# Patient Record
Sex: FEMALE | Race: WHITE | NOT HISPANIC OR LATINO | Employment: OTHER | URBAN - METROPOLITAN AREA
[De-identification: names, ages, dates, MRNs, and addresses within clinical notes are randomized per-mention and may not be internally consistent; named-entity substitution may affect disease eponyms.]

---

## 2017-03-02 ENCOUNTER — TELEPHONE (OUTPATIENT)
Dept: ORTHOPEDIC SURGERY | Facility: CLINIC | Age: 62
End: 2017-03-02

## 2017-03-03 ENCOUNTER — OFFICE VISIT (OUTPATIENT)
Dept: ORTHOPEDIC SURGERY | Facility: CLINIC | Age: 62
End: 2017-03-03

## 2017-03-03 DIAGNOSIS — G89.29 CHRONIC PAIN OF RIGHT KNEE: Primary | ICD-10-CM

## 2017-03-03 DIAGNOSIS — M25.561 CHRONIC PAIN OF RIGHT KNEE: Primary | ICD-10-CM

## 2017-03-03 PROCEDURE — 20610 DRAIN/INJ JOINT/BURSA W/O US: CPT | Performed by: ORTHOPAEDIC SURGERY

## 2017-03-03 PROCEDURE — 99213 OFFICE O/P EST LOW 20 MIN: CPT | Performed by: ORTHOPAEDIC SURGERY

## 2017-03-03 PROCEDURE — 73562 X-RAY EXAM OF KNEE 3: CPT | Performed by: ORTHOPAEDIC SURGERY

## 2017-03-03 RX ORDER — MELOXICAM 15 MG/1
TABLET ORAL
Qty: 30 TABLET | Refills: 3 | Status: SHIPPED | OUTPATIENT
Start: 2017-03-03 | End: 2018-06-18

## 2017-03-03 RX ADMIN — METHYLPREDNISOLONE ACETATE 80 MG: 80 INJECTION, SUSPENSION INTRA-ARTICULAR; INTRALESIONAL; INTRAMUSCULAR; SOFT TISSUE at 14:26

## 2017-03-03 RX ADMIN — BUPIVACAINE HYDROCHLORIDE 4 ML: 5 INJECTION, SOLUTION EPIDURAL; INTRACAUDAL at 14:26

## 2017-03-03 NOTE — PROGRESS NOTES
Right Knee Joint Injection      Patient: Lizeth Kennedy        YOB: 1955            Chief Complaints: Right Knee pain  Chief Complaint   Patient presents with   • Right Knee - Establish Care           History of Present Illness: Pt gets intermittent  injections with good relief. Is here for repeat injection. Understands options.      Physical Exam: 61 y.o. female  General Appearance:    Alert, cooperative, in no acute distress                 There were no vitals filed for this visit.   Patient is alert and read ×3 no acute distress appears her above-listed at height weight and age.  Affect is normal respiratory rate is normal unlabored. Heart rate regular rate rhythm, sclera, dentition and hearing are normal for the purpose of this exam.  Exam and complaints are unchanged.      Procedure:  Large Joint Arthrocentesis  Date/Time: 3/3/2017 12:48 PM  Consent given by: patient  Site marked: site marked  Timeout: Immediately prior to procedure a time out was called to verify the correct patient, procedure, equipment, support staff and site/side marked as required   Supporting Documentation  Indications: pain   Procedure Details  Location: knee - R knee  Needle size: 25 G  Approach: anteromedial  Medications administered: 4 mL bupivacaine (PF) 0.5 %; 80 mg methylPREDNISolone acetate 80 MG/ML                  Assessment. Persistent knee pain      Plan: Is to proceed with injection    The knee joint was injected under strict sterile technique with Marcaine and Depo-Medrol this was done sterilely and tolerated tolerated well.

## 2017-03-06 NOTE — PROGRESS NOTES
New Knee      Patient: Lizeth Kennedy        YOB: 1955    Medical Record Number: [id number]        Chief Complaints: Left knee pain      History of Present Illness: This is a  61 y.o. female who presents complaining of left knee pain.  Spent ongoing about 3 days no history injury change in activity that she can recall.  I've seen her in the past for her left knee that is been doing good the last I saw her was 2 years ago.  On the right, she has occasional night pain she has distinct swelling and limitation of activity.  She is getting ready to head to [place] on Monday to take care of her grandchildren.  Her symptoms are described as moderate constant aching burning with swelling she has pain with standing walking and running.  Her past medical history is unremarkable.  She is retired        Allergies:   Allergies   Allergen Reactions   • Diazepam Other (See Comments)     OPPOSITE EFFECT--MAKES HYPER       Medications:   Home Medications:  No current outpatient prescriptions on file prior to visit.     No current facility-administered medications on file prior to visit.      Current Medications:  Scheduled Meds:  Continuous Infusions:  No current facility-administered medications for this visit.   PRN Meds:.    History reviewed. No pertinent past medical history.   No past surgical history on file.     Social History     Occupational History   • Not on file.     Social History Main Topics   • Smoking status: Not on file   • Smokeless tobacco: Not on file   • Alcohol use Not on file   • Drug use: Not on file   • Sexual activity: Not on file    Social History     Social History Narrative   • No narrative on file      History reviewed. No pertinent family history.          Review of Systems: Her 14 point review of systems are remarkable for the pertinent positives listed in the chart by the patient the remainder are negative    Review of Systems      Physical Exam: 61 y.o. female  General Appearance:     Alert, cooperative, in no acute distress                 There were no vitals filed for this visit.   Patient is alert and read ×3 no acute distress appears her above-listed at height weight and age.  Affect is normal respiratory rate is normal unlabored. Heart rate regular rate rhythm, sclera, dentition and hearing are normal for the purpose of this exam.        Ortho Exam Physical exam of the right  knee reveals a mild-to-moderate  effusion no redness.  The patient does have tenderness about the medial l joint line.  No tenderness about the lateralRight l joint line.  A negative bounce home and a positive l medial April.    Patient has a stable ligamentous exam.  The patient has a negative Lachman and negative anterior drawer and a negative pivot shift.  Quads are reasonable and symmetric bilaterally.  Calf is soft and nontender.  There is no overlying skin changes no lymphedema lymphadenopathy.  Patient has good hip range of motion full symmetric and asymptomatic and a normal ankle exam.  She has good distal pulses and sensation distally is intact        Large Joint Arthrocentesis  Date/Time: 3/3/2017 2:26 PM  Consent given by: patient  Site marked: site marked  Timeout: Immediately prior to procedure a time out was called to verify the correct patient, procedure, equipment, support staff and site/side marked as required   Supporting Documentation  Indications: pain   Procedure Details  Location: knee - R knee  Needle size: 25 G  Approach: anteromedial  Medications administered: 80 mg methylPREDNISolone acetate 80 MG/ML; 4 mL bupivacaine (PF) 0.5 %                     Radiology:   AP, Lateral and merchant views of the right knee  were ordered/reviewed to evauateknee pain, i have comp to previous films  These show some mild patellofemoral OA otherwise neutral alignment no evidence of any acute bony pathology     Assessment/Plan:        right knee pain I'm suspicious of mechanical i.e. meniscus pathology that  could also be patellofemoral.  I'm going to injectors a diagnostic and therapeutic tool.  If she fails to respond we will proceed with an MRI.  I will also start her on Mobic with strict precautions

## 2017-03-13 RX ORDER — BUPIVACAINE HYDROCHLORIDE 5 MG/ML
4 INJECTION, SOLUTION EPIDURAL; INTRACAUDAL
Status: COMPLETED | OUTPATIENT
Start: 2017-03-03 | End: 2017-03-03

## 2017-03-13 RX ORDER — METHYLPREDNISOLONE ACETATE 80 MG/ML
80 INJECTION, SUSPENSION INTRA-ARTICULAR; INTRALESIONAL; INTRAMUSCULAR; SOFT TISSUE
Status: COMPLETED | OUTPATIENT
Start: 2017-03-03 | End: 2017-03-03

## 2018-04-03 ENCOUNTER — PREP FOR SURGERY (OUTPATIENT)
Dept: OTHER | Facility: HOSPITAL | Age: 63
End: 2018-04-03

## 2018-04-03 DIAGNOSIS — Z12.11 SCREEN FOR COLON CANCER: Primary | ICD-10-CM

## 2018-04-10 PROBLEM — Z12.11 SCREEN FOR COLON CANCER: Status: ACTIVE | Noted: 2018-04-10

## 2018-06-18 RX ORDER — MELATONIN
1000 DAILY
COMMUNITY

## 2018-06-19 ENCOUNTER — ANESTHESIA (OUTPATIENT)
Dept: GASTROENTEROLOGY | Facility: HOSPITAL | Age: 63
End: 2018-06-19

## 2018-06-19 ENCOUNTER — ANESTHESIA EVENT (OUTPATIENT)
Dept: GASTROENTEROLOGY | Facility: HOSPITAL | Age: 63
End: 2018-06-19

## 2018-06-19 ENCOUNTER — HOSPITAL ENCOUNTER (OUTPATIENT)
Facility: HOSPITAL | Age: 63
Setting detail: HOSPITAL OUTPATIENT SURGERY
Discharge: HOME | End: 2018-06-19
Attending: INTERNAL MEDICINE | Admitting: INTERNAL MEDICINE

## 2018-06-19 VITALS
BODY MASS INDEX: 23.37 KG/M2 | WEIGHT: 127 LBS | RESPIRATION RATE: 16 BRPM | HEIGHT: 62 IN | OXYGEN SATURATION: 99 % | TEMPERATURE: 97.6 F | DIASTOLIC BLOOD PRESSURE: 75 MMHG | HEART RATE: 75 BPM | SYSTOLIC BLOOD PRESSURE: 110 MMHG

## 2018-06-19 DIAGNOSIS — Z12.11 SCREEN FOR COLON CANCER: ICD-10-CM

## 2018-06-19 PROCEDURE — 45380 COLONOSCOPY AND BIOPSY: CPT | Performed by: INTERNAL MEDICINE

## 2018-06-19 PROCEDURE — 25010000002 PROPOFOL 10 MG/ML EMULSION: Performed by: NURSE ANESTHETIST, CERTIFIED REGISTERED

## 2018-06-19 PROCEDURE — 88305 TISSUE EXAM BY PATHOLOGIST: CPT | Performed by: INTERNAL MEDICINE

## 2018-06-19 RX ORDER — SODIUM CHLORIDE, SODIUM LACTATE, POTASSIUM CHLORIDE, CALCIUM CHLORIDE 600; 310; 30; 20 MG/100ML; MG/100ML; MG/100ML; MG/100ML
1000 INJECTION, SOLUTION INTRAVENOUS CONTINUOUS
Status: DISCONTINUED | OUTPATIENT
Start: 2018-06-19 | End: 2018-06-19 | Stop reason: HOSPADM

## 2018-06-19 RX ORDER — PROPOFOL 10 MG/ML
VIAL (ML) INTRAVENOUS CONTINUOUS PRN
Status: DISCONTINUED | OUTPATIENT
Start: 2018-06-19 | End: 2018-06-19 | Stop reason: SURG

## 2018-06-19 RX ORDER — SODIUM CHLORIDE 0.9 % (FLUSH) 0.9 %
3 SYRINGE (ML) INJECTION AS NEEDED
Status: DISCONTINUED | OUTPATIENT
Start: 2018-06-19 | End: 2018-06-19 | Stop reason: HOSPADM

## 2018-06-19 RX ORDER — LIDOCAINE HYDROCHLORIDE 10 MG/ML
0.5 INJECTION, SOLUTION INFILTRATION; PERINEURAL ONCE AS NEEDED
Status: DISCONTINUED | OUTPATIENT
Start: 2018-06-19 | End: 2018-06-19 | Stop reason: HOSPADM

## 2018-06-19 RX ADMIN — PROPOFOL 200 MCG/KG/MIN: 10 INJECTION, EMULSION INTRAVENOUS at 07:55

## 2018-06-19 RX ADMIN — SODIUM CHLORIDE, POTASSIUM CHLORIDE, SODIUM LACTATE AND CALCIUM CHLORIDE 1000 ML: 600; 310; 30; 20 INJECTION, SOLUTION INTRAVENOUS at 07:34

## 2018-06-19 NOTE — ANESTHESIA PREPROCEDURE EVALUATION
Anesthesia Evaluation                  Airway   Mallampati: I  TM distance: >3 FB  Neck ROM: full  No difficulty expected  Dental - normal exam     Pulmonary - normal exam   (+) pulmonary embolism,   Cardiovascular - normal exam    (+) DVT,       Neuro/Psych  GI/Hepatic/Renal/Endo      Musculoskeletal     Abdominal    Substance History      OB/GYN          Other                        Anesthesia Plan    ASA 2     MAC     intravenous induction   Anesthetic plan and risks discussed with patient.

## 2018-06-19 NOTE — ANESTHESIA POSTPROCEDURE EVALUATION
Patient: Lizeth Kennedy    Procedure Summary     Date:  06/19/18 Room / Location:   [facility]    Anesthesia Start:  0754 Anesthesia Stop:  0823    Procedure:  COLONOSCOPY with polypectomy (cold bx) (N/A ) Diagnosis:       Screen for colon cancer      (Screen for colon cancer [Z12.11])    Surgeon:  Nnamdi Gregorio MD Provider:  Bridget Canas MD    Anesthesia Type:  MAC ASA Status:  2          Anesthesia Type: MAC  Last vitals  BP   110/75 (06/19/18 0841)   Temp   36.4 °C (97.6 °F) (06/19/18 0831)   Pulse   75 (06/19/18 0841)   Resp   16 (06/19/18 0841)     SpO2   99 % (06/19/18 0841)     Post Anesthesia Care and Evaluation    Patient location during evaluation: bedside  Patient participation: complete - patient participated  Level of consciousness: awake  Pain management: adequate  Airway patency: patent  Anesthetic complications: No anesthetic complications    Cardiovascular status: acceptable  Respiratory status: acceptable  Hydration status: acceptable

## 2018-06-19 NOTE — H&P
"[facility]  Pre Procedure History & Physical    Chief Complaint:   Time for my screening colonoscopy.    Subjective     HPI:   62 y.o. female who is here for a screening colonoscopy. Her last colonoscopy was about 13 yrs ago.     Past Medical History:   Past Medical History:   Diagnosis Date   • History of DVT (deep vein thrombosis) 1976   • History of pulmonary embolism 1976   • PONV (postoperative nausea and vomiting)        Family History:  Family History   Problem Relation Age of Onset   • Malig Hyperthermia Neg Hx        Social History:   reports that she has never smoked. She has never used smokeless tobacco. She reports that she does not drink alcohol or use drugs.    Medications:   Prescriptions Prior to Admission   Medication Sig Dispense Refill Last Dose   • cholecalciferol (VITAMIN D3) 1000 units tablet Take 1,000 Units by mouth Daily.   6/18/2018 at Unknown time   • aspirin 81 MG tablet Take 81 mg by mouth 1 (One) Time Per Week.   6/4/2018       Allergies:  Diazepam and Versed [midazolam]    ROS:    Pertinent items are noted in HPI     Objective     Blood pressure 114/70, pulse 70, temperature 97.8 °F (36.6 °C), temperature source Oral, resp. rate 16, height 157.5 cm (62\"), weight 57.6 kg (127 lb), SpO2 98 %.    Physical Exam   Constitutional: Pt is oriented to person, place, and time and well-developed, well-nourished, and in no distress.   HENT:   Mouth/Throat: Oropharynx is clear and moist.   Neck: Normal range of motion. Neck supple.   Cardiovascular: Normal rate, regular rhythm and normal heart sounds.    Pulmonary/Chest: Effort normal and breath sounds normal. No respiratory distress. No  wheezes.   Abdominal: Soft. Bowel sounds are normal.   Skin: Skin is warm and dry.   Psychiatric: Mood, memory, affect and judgment normal.     Assessment/Plan     Diagnosis:  62 y.o. female who is here for a screening colonoscopy. Her last colonoscopy was about 13 yrs ago.     Anticipated " Surgical Procedure:  Colonoscopy    The risks, benefits, and alternatives of this procedure have been discussed with the patient or the responsible party- the patient understands and agrees to proceed.

## 2018-06-19 NOTE — DISCHARGE INSTRUCTIONS
For the next 24 hours patient needs to be with a responsible adult.    For 24 hours DO NOT drive, operate machinery, appliances, drink alcohol, make important decisions or sign legal documents.    Start with a light or bland diet and advance to regular diet as tolerated.    Follow recommendations on procedure report provided by your doctor.    Call Dr Gregorio for problems .  Problems may include but not limited to: lar7ge amounts of bleeding, trouble breathing, repeated vomiting, severe unrelieved pain, fever or chills.      Call Dr Gregorio if you have not received your pathology results within 14 days.

## 2018-06-20 LAB
CYTO UR: NORMAL
LAB AP CASE REPORT: NORMAL
PATH REPORT.FINAL DX SPEC: NORMAL
PATH REPORT.GROSS SPEC: NORMAL

## 2018-06-28 NOTE — PROGRESS NOTES
Tell her that the rectal polyp that was removed was not cancerous and not precancerous, which is good.  I would recommend a repeat screening colonoscopy in 10 years.

## 2018-08-09 ENCOUNTER — TELEPHONE (OUTPATIENT)
Dept: GASTROENTEROLOGY | Facility: CLINIC | Age: 63
End: 2018-08-09

## 2018-08-09 NOTE — TELEPHONE ENCOUNTER
----- Message from Nnamdi Gregorio MD sent at 6/28/2018  6:10 PM EDT -----  Tell her that the rectal polyp that was removed was not cancerous and not precancerous, which is good.  I would recommend a repeat screening colonoscopy in 10 years.

## 2018-08-09 NOTE — TELEPHONE ENCOUNTER
Called pt at both numbers and left vm for pt to call back.     C/s placed in recall for 06/19/2028.

## 2018-08-10 NOTE — TELEPHONE ENCOUNTER
Call to pt.  Advise per Dr Gregorio that rectal polyp that was removed was not cancerous and not precancerous, which is good.  Would recommend repeat screening c/s in 10 yrs.      Pt verb understanding.

## 2019-02-15 ENCOUNTER — HOSPITAL ENCOUNTER (OUTPATIENT)
Dept: GENERAL RADIOLOGY | Facility: HOSPITAL | Age: 64
Discharge: HOME | End: 2019-02-15
Admitting: INTERNAL MEDICINE

## 2019-02-15 ENCOUNTER — TRANSCRIBE ORDERS (OUTPATIENT)
Dept: ADMINISTRATIVE | Facility: HOSPITAL | Age: 64
End: 2019-02-15

## 2019-02-15 DIAGNOSIS — R52 PAIN: ICD-10-CM

## 2019-02-15 DIAGNOSIS — R52 PAIN: Primary | ICD-10-CM

## 2019-02-15 PROCEDURE — 72220 X-RAY EXAM SACRUM TAILBONE: CPT

## 2019-04-12 ENCOUNTER — OFFICE VISIT (OUTPATIENT)
Dept: ORTHOPEDIC SURGERY | Facility: CLINIC | Age: 64
End: 2019-04-12

## 2019-04-12 VITALS — WEIGHT: 137 LBS | HEIGHT: 62 IN | TEMPERATURE: 98.9 F | BODY MASS INDEX: 25.21 KG/M2

## 2019-04-12 DIAGNOSIS — M19.90 ARTHRITIS: ICD-10-CM

## 2019-04-12 DIAGNOSIS — M25.562 CHRONIC PAIN OF LEFT KNEE: Primary | ICD-10-CM

## 2019-04-12 DIAGNOSIS — G89.29 CHRONIC PAIN OF LEFT KNEE: Primary | ICD-10-CM

## 2019-04-12 PROCEDURE — 20610 DRAIN/INJ JOINT/BURSA W/O US: CPT | Performed by: ORTHOPAEDIC SURGERY

## 2019-04-12 PROCEDURE — 99213 OFFICE O/P EST LOW 20 MIN: CPT | Performed by: ORTHOPAEDIC SURGERY

## 2019-04-12 PROCEDURE — 73562 X-RAY EXAM OF KNEE 3: CPT | Performed by: ORTHOPAEDIC SURGERY

## 2019-04-12 RX ORDER — METHYLPREDNISOLONE ACETATE 80 MG/ML
80 INJECTION, SUSPENSION INTRA-ARTICULAR; INTRALESIONAL; INTRAMUSCULAR; SOFT TISSUE
Status: COMPLETED | OUTPATIENT
Start: 2019-04-12 | End: 2019-04-12

## 2019-04-12 RX ADMIN — METHYLPREDNISOLONE ACETATE 80 MG: 80 INJECTION, SUSPENSION INTRA-ARTICULAR; INTRALESIONAL; INTRAMUSCULAR; SOFT TISSUE at 09:22

## 2019-04-12 NOTE — PROGRESS NOTES
New Left Knee      Patient: Lizeth Kennedy        YOB: 1955    Medical Record Number: [id number]        Chief Complaints: left knee pain  Chief Complaint   Patient presents with   • Left Knee - Establish Care           History of Present Illness: This is a 63-year-old female who I have seen several years ago for left knee pain she was noted to have some degenerative change at that time she did well with an injection however recently over the last couple months she has had worsening of her left knee pain worse with activity her symptoms are moderate intermittent stabbing aching worse with walking and stairs somewhat better with rest she is retired past medical history is unremarkable other than a history of DVT and PE        Allergies:   Allergies   Allergen Reactions   • Diazepam Other (See Comments)     OPPOSITE EFFECT--MAKES HYPER   • Versed [Midazolam] Itching       Medications:   Home Medications:  Current Outpatient Medications on File Prior to Visit   Medication Sig   • aspirin 81 MG tablet Take 81 mg by mouth 1 (One) Time Per Week.   • cholecalciferol (VITAMIN D3) 1000 units tablet Take 1,000 Units by mouth Daily.     No current facility-administered medications on file prior to visit.      Current Medications:  Scheduled Meds:  Continuous Infusions:  No current facility-administered medications for this visit.   PRN Meds:.    Past Medical History:   Diagnosis Date   • History of DVT (deep vein thrombosis)    • History of pulmonary embolism    • PONV (postoperative nausea and vomiting)         Past Surgical History:   Procedure Laterality Date   •  SECTION     • COLONOSCOPY N/A 2018    Procedure: COLONOSCOPY with polypectomy (cold bx);  Surgeon: Nnamdi Gregorio MD;  Location: [facility] END[facility];  Service: Gastroenterology   • HERNIA REPAIR     • KNEE SURGERY Left         Social History     Occupational History   • Not on file   Tobacco Use   • Smoking status: Never Smoker   •  "Smokeless tobacco: Never Used   Substance and Sexual Activity   • Alcohol use: No   • Drug use: No   • Sexual activity: Defer      Social History     Social History Narrative   • Not on file        Family History   Problem Relation Age of Onset   • Malig Hyperthermia Neg Hx              Review of Systems: 14 point review of systems are remarkable for the knee pain only the remainder are negative    Review of Systems      Physical Exam: 63 y.o. female  General Appearance:    Alert, cooperative, in no acute distress                   Vitals:    04/12/19 0908   Temp: 98.9 °F (37.2 °C)   Weight: 62.1 kg (137 lb)   Height: 157.5 cm (62\")      Patient is alert and read ×3 no acute distress appears her above-listed at height weight and age.  Affect is normal respiratory rate is normal unlabored. Heart rate regular rate rhythm, sclera, dentition and hearing are normal for the purpose of this exam.        Ortho Exam Physical exam of the left knee reveals no effusion, no erythema.  It mild loss of extension and full flexion  Patient has mild varus alignment.  They have mild tenderness to palpation about the medial compartment, no tenderness laterally..  The patient has a negative bounce home, negative April and a stable ligamentous exam.  Quad tone is reasonable and symmetric.  There are no overlying skin changes no lymphedema no lymphadenopathy.  There is good hip range of motion which is full symmetric and asymptomatic and a normal ankle exam.      Large Joint Arthrocentesis: L knee  Date/Time: 4/12/2019 9:22 AM  Consent given by: patient  Site marked: site marked  Timeout: Immediately prior to procedure a time out was called to verify the correct patient, procedure, equipment, support staff and site/side marked as required   Supporting Documentation  Indications: pain   Procedure Details  Location: knee - L knee  Needle size: 25 G  Approach: anteromedial  Medications administered: 80 mg methylPREDNISolone acetate 80 " MG/ML; 2 mL lidocaine (cardiac) 20 MG/ML  Patient tolerance: patient tolerated the procedure well with no immediate complications                   Radiology:   AP, Lateral and merchant views of the left knee  were ordered/reviewed to evauateknee pain.  I have compared to previous films she does have marked narrowing of her medial compartment with osteophyte formation but no dramatic change from her last x-rays over 2 years ago  Imaging Results (most recent)     Procedure Component Value Units Date/Time    XR Knee 3 View Left [[id number]] Resulted:  04/12/19 0909     Updated:  04/12/19 0910    Impression:       Ordering physician's impression is located in the Encounter Note dated 04/12/19. X-ray performed in the DR room.          Assessment/Plan:    Left knee pain this is degenerative in origin I think she benefit from an injection first she did well with Synvisc in the past we will bring her back in 1 month for Synvisc

## 2019-06-20 ENCOUNTER — TELEPHONE (OUTPATIENT)
Dept: ORTHOPEDIC SURGERY | Facility: CLINIC | Age: 64
End: 2019-06-20

## 2019-07-11 ENCOUNTER — OFFICE VISIT (OUTPATIENT)
Dept: ORTHOPEDIC SURGERY | Facility: CLINIC | Age: 64
End: 2019-07-11

## 2019-07-11 VITALS — TEMPERATURE: 98.5 F | WEIGHT: 139.4 LBS | BODY MASS INDEX: 25.65 KG/M2 | HEIGHT: 62 IN

## 2019-07-11 DIAGNOSIS — M19.90 ARTHRITIS: Primary | ICD-10-CM

## 2019-07-11 PROCEDURE — 20610 DRAIN/INJ JOINT/BURSA W/O US: CPT | Performed by: ORTHOPAEDIC SURGERY

## 2019-07-11 RX ADMIN — LIDOCAINE HYDROCHLORIDE 2 ML: 10 INJECTION, SOLUTION EPIDURAL; INFILTRATION; INTRACAUDAL; PERINEURAL at 15:02

## 2019-07-15 RX ORDER — LIDOCAINE HYDROCHLORIDE 10 MG/ML
2 INJECTION, SOLUTION EPIDURAL; INFILTRATION; INTRACAUDAL; PERINEURAL
Status: COMPLETED | OUTPATIENT
Start: 2019-07-11 | End: 2019-07-11

## 2019-12-02 ENCOUNTER — PROCEDURE VISIT (OUTPATIENT)
Dept: OBSTETRICS AND GYNECOLOGY | Facility: CLINIC | Age: 64
End: 2019-12-02

## 2019-12-02 ENCOUNTER — OFFICE VISIT (OUTPATIENT)
Dept: OBSTETRICS AND GYNECOLOGY | Facility: CLINIC | Age: 64
End: 2019-12-02

## 2019-12-02 ENCOUNTER — APPOINTMENT (OUTPATIENT)
Dept: WOMENS IMAGING | Facility: HOSPITAL | Age: 64
End: 2019-12-02

## 2019-12-02 VITALS
WEIGHT: 135 LBS | SYSTOLIC BLOOD PRESSURE: 121 MMHG | BODY MASS INDEX: 24.84 KG/M2 | DIASTOLIC BLOOD PRESSURE: 79 MMHG | HEIGHT: 62 IN

## 2019-12-02 DIAGNOSIS — Z78.0 POST-MENOPAUSAL: Primary | ICD-10-CM

## 2019-12-02 DIAGNOSIS — Z01.419 ENCOUNTER FOR GYNECOLOGICAL EXAMINATION WITHOUT ABNORMAL FINDING: Primary | ICD-10-CM

## 2019-12-02 DIAGNOSIS — Z00.00 PREVENTATIVE HEALTH CARE: ICD-10-CM

## 2019-12-02 DIAGNOSIS — Z12.39 SCREENING FOR BREAST CANCER: ICD-10-CM

## 2019-12-02 DIAGNOSIS — N95.1 CLIMACTERIC: ICD-10-CM

## 2019-12-02 DIAGNOSIS — Z12.31 VISIT FOR SCREENING MAMMOGRAM: Primary | ICD-10-CM

## 2019-12-02 PROCEDURE — 77063 BREAST TOMOSYNTHESIS BI: CPT | Performed by: OBSTETRICS & GYNECOLOGY

## 2019-12-02 PROCEDURE — 99396 PREV VISIT EST AGE 40-64: CPT | Performed by: OBSTETRICS & GYNECOLOGY

## 2019-12-02 PROCEDURE — 77067 SCR MAMMO BI INCL CAD: CPT | Performed by: OBSTETRICS & GYNECOLOGY

## 2019-12-02 PROCEDURE — 77067 SCR MAMMO BI INCL CAD: CPT | Performed by: RADIOLOGY

## 2019-12-02 PROCEDURE — 77063 BREAST TOMOSYNTHESIS BI: CPT | Performed by: RADIOLOGY

## 2019-12-02 PROCEDURE — 77080 DXA BONE DENSITY AXIAL: CPT | Performed by: OBSTETRICS & GYNECOLOGY

## 2019-12-02 NOTE — PROGRESS NOTES
Subjective    Chief Complaint   Patient presents with   • Gynecologic Exam     AE      History of Present Illness    Lizeth Kennedy is a 64 y.o. female who presents for annual exam.  Non-smoker.  Colonoscopy this past year and due every 10 years per Dr. Gregorio.  Mammogram today.  DEXA scan being scheduled.  No bleeding and no problems.  Uses a small speculum.    Obstetric History:  OB History     No data available         Menstrual History:     No LMP recorded. Patient is postmenopausal.       Past Medical History:   Diagnosis Date   • History of DVT (deep vein thrombosis) 1976   • History of pulmonary embolism 1976   • PONV (postoperative nausea and vomiting)      Family History   Problem Relation Age of Onset   • Malig Hyperthermia Neg Hx      Social History     Tobacco Use   Smoking Status Never Smoker   Smokeless Tobacco Never Used     [unfilled]    The following portions of the patient's history were reviewed and updated as appropriate: allergies, current medications, past family history, past medical history, past social history, past surgical history and problem list.    Review of Systems   Constitutional: Negative.  Negative for fever and unexpected weight change.   HENT: Negative.    Respiratory: Negative for shortness of breath and wheezing.    Cardiovascular: Negative for chest pain, palpitations and leg swelling.   Gastrointestinal: Negative for abdominal pain, anal bleeding and blood in stool.   Genitourinary: Negative for dysuria, pelvic pain, urgency, vaginal bleeding, vaginal discharge and vaginal pain.   Skin: Negative.    Neurological: Negative.    Hematological: Negative.  Negative for adenopathy.   Psychiatric/Behavioral: Negative.  Negative for dysphoric mood. The patient is not nervous/anxious.             Objective   Physical Exam   Constitutional: She is oriented to person, place, and time. Vital signs are normal. She appears well-developed and well-nourished.   HENT:   Head: Normocephalic.  "  Neck: Trachea normal. No tracheal deviation present. No thyromegaly present.   Cardiovascular: Normal rate, regular rhythm and normal heart sounds.   No murmur heard.  Pulmonary/Chest: Effort normal and breath sounds normal.   Breasts without masses, tenderness or nipple discharge   Abdominal: Soft. Normal appearance. She exhibits no mass. There is no hepatosplenomegaly. There is no tenderness. No hernia.   Genitourinary: Rectum normal, vagina normal and uterus normal. Uterus is not enlarged and not tender. Cervix exhibits no motion tenderness. Right adnexum displays no mass and no tenderness. Left adnexum displays no mass and no tenderness. No vaginal discharge found.   Genitourinary Comments: External genitalia normal    Lymphadenopathy:     She has no cervical adenopathy.     She has no axillary adenopathy.   Neurological: She is alert and oriented to person, place, and time.   Skin: Skin is warm and dry. No rash noted.   Psychiatric: She has a normal mood and affect. Her behavior is normal. Cognition and memory are normal.       /79   Ht 157.5 cm (62\")   Wt 61.2 kg (135 lb)   BMI 24.69 kg/m²     Assessment/Plan   Lizeth was seen today for gynecologic exam.    Diagnoses and all orders for this visit:    Encounter for gynecological examination without abnormal finding  -     IGP,rfx Aptima HPV All Pth    Climacteric    Screening for breast cancer        Mammogram.  DEXA scan.  Return to office 1 year.    Counseled about taking calcium with vitamin D twice daily.             " at delivery/artificial rupture

## 2019-12-03 LAB
CONV .: NORMAL
CYTOLOGIST CVX/VAG CYTO: NORMAL
CYTOLOGY CVX/VAG DOC CYTO: NORMAL
CYTOLOGY CVX/VAG DOC THIN PREP: NORMAL
DX ICD CODE: NORMAL
HIV 1 & 2 AB SER-IMP: NORMAL
OTHER STN SPEC: NORMAL
STAT OF ADQ CVX/VAG CYTO-IMP: NORMAL

## 2019-12-06 ENCOUNTER — TELEPHONE (OUTPATIENT)
Dept: OBSTETRICS AND GYNECOLOGY | Facility: CLINIC | Age: 64
End: 2019-12-06

## 2020-08-25 ENCOUNTER — TELEPHONE (OUTPATIENT)
Dept: ORTHOPEDIC SURGERY | Facility: CLINIC | Age: 65
End: 2020-08-25

## 2020-08-25 NOTE — TELEPHONE ENCOUNTER
FYI: Scheduled patient to see VIKKI this Fri 8/28 at 10:00 AM for FU / LEFT Knee / Discuss GEL INJ (previous 7/11/19). Message sent to Kathy for insurance prior auth. Patient can be reached via cell at [phone]. Thanks /srh

## 2020-08-28 ENCOUNTER — OFFICE VISIT (OUTPATIENT)
Dept: ORTHOPEDIC SURGERY | Facility: CLINIC | Age: 65
End: 2020-08-28

## 2020-08-28 VITALS — HEIGHT: 62 IN | BODY MASS INDEX: 24.31 KG/M2 | TEMPERATURE: 97.2 F | WEIGHT: 132.1 LBS

## 2020-08-28 DIAGNOSIS — M17.12 PRIMARY LOCALIZED OSTEOARTHROSIS OF LEFT LOWER LEG: ICD-10-CM

## 2020-08-28 DIAGNOSIS — G89.29 CHRONIC PAIN OF LEFT KNEE: Primary | ICD-10-CM

## 2020-08-28 DIAGNOSIS — M25.562 CHRONIC PAIN OF LEFT KNEE: Primary | ICD-10-CM

## 2020-08-28 PROCEDURE — 99212 OFFICE O/P EST SF 10 MIN: CPT | Performed by: ORTHOPAEDIC SURGERY

## 2020-08-28 PROCEDURE — 20610 DRAIN/INJ JOINT/BURSA W/O US: CPT | Performed by: ORTHOPAEDIC SURGERY

## 2020-08-28 PROCEDURE — 73562 X-RAY EXAM OF KNEE 3: CPT | Performed by: ORTHOPAEDIC SURGERY

## 2020-08-28 RX ORDER — PHENOL 1.4 %
600 AEROSOL, SPRAY (ML) MUCOUS MEMBRANE DAILY
COMMUNITY

## 2020-08-28 NOTE — PROGRESS NOTES
Patient: Lizeth Kennedy  YOB: 1955  Date of Service: 2020    Chief Complaints: Left knee  Subjective:    History of Present Illness: Pt is seen in the office today with complaints of left knee pain I saw her over a year ago for left knee arthritis she is been doing well she is walking 3 miles a day tolerating things well.  Over the last month she is had worsening of her symptoms no no history injury change in activity she does have occasional swelling she has pain with activity symptoms are moderate intermittent aching worse with activity somewhat better with rest her past medical history is marked for history of DVT and PE.          Allergies:   Allergies   Allergen Reactions   • Diazepam Other (See Comments)     OPPOSITE EFFECT--MAKES HYPER   • Versed [Midazolam] Itching       Medications:   Home Medications:  Current Outpatient Medications on File Prior to Visit   Medication Sig   • aspirin 81 MG tablet Take 81 mg by mouth 1 (One) Time Per Week.   • cholecalciferol (VITAMIN D3) 1000 units tablet Take 1,000 Units by mouth Daily.     No current facility-administered medications on file prior to visit.      Current Medications:  Scheduled Meds:  Continuous Infusions:  No current facility-administered medications for this visit.   PRN Meds:.    I have reviewed the patient's medical history in detail and updated the computerized patient record.  Review and summarization of old records include:    Past Medical History:   Diagnosis Date   • History of DVT (deep vein thrombosis)    • History of pulmonary embolism    • PONV (postoperative nausea and vomiting)         Past Surgical History:   Procedure Laterality Date   •  SECTION     • COLONOSCOPY N/A 2018    Procedure: COLONOSCOPY with polypectomy (cold bx);  Surgeon: Nnamdi Gregorio MD;  Location: [facility];  Service: Gastroenterology   • HERNIA REPAIR     • KNEE SURGERY Left         Social History     Occupational History    • Not on file   Tobacco Use   • Smoking status: Never Smoker   • Smokeless tobacco: Never Used   Substance and Sexual Activity   • Alcohol use: No   • Drug use: No   • Sexual activity: Defer    Social History     Social History Narrative   • Not on file        Family History   Problem Relation Age of Onset   • Malig Hyperthermia Neg Hx        ROS: 14 point review of systems was performed and was negative except for documented findings in HPI and today's encounter.     Allergies:   Allergies   Allergen Reactions   • Diazepam Other (See Comments)     OPPOSITE EFFECT--MAKES HYPER   • Versed [Midazolam] Itching     Constitutional:  Denies fever, shaking or chills   Eyes:  Denies change in visual acuity   HENT:  Denies nasal congestion or sore throat   Respiratory:  Denies cough or shortness of breath   Cardiovascular:  Denies chest pain or severe LE edema   GI:  Denies abdominal pain, nausea, vomiting, bloody stools or diarrhea   Musculoskeletal:  Numbness, tingling, or loss of motor function only as noted above in history of present illness.  : Denies painful urination or hematuria  Integument:  Denies rash, lesion or ulceration   Neurologic:  Denies headache or focal weakness  Endocrine:  Denies lymphadenopathy  Psych:  Denies confusion or change in mental status   Hem:  Denies active bleeding      Physical Exam: 64 y.o. female  Wt Readings from Last 3 Encounters:   12/02/19 61.2 kg (135 lb)   07/11/19 63.2 kg (139 lb 6.4 oz)   04/12/19 62.1 kg (137 lb)       There is no height or weight on file to calculate BMI.  No height and weight on file for this encounter.  There were no vitals filed for this visit.  Vital signs reviewed.   General Appearance:    Alert, cooperative, in no acute distress                    Ortho exam    Physical exam of the left knee reveals no effusion, no erythema.  It mild loss of extension and full flexion  Patient has mild varus alignment.  They have mild tenderness to palpation about  the medial compartment, no tenderness laterally..  The patient has a negative bounce home, negative April and a stable ligamentous exam.  Quad tone is reasonable and symmetric.  There are no overlying skin changes no lymphedema no lymphadenopathy.  There is good hip range of motion which is full symmetric and asymptomatic and a normal ankle exam.        X-rays AP lateral merchant view left knee were taken to evaluate her symptoms and compared to x-rays done 1 year ago they show some medial compartment narrowing with osteophytes on the femur and the tibia and about 75% narrowing of the medial compartment.  This has progressed since last x-rays     .time    Assessment: Left knee pain I think her arthritis is getting worse although she does seem to have found a good recipe for her knee she is able to walk able to tolerate of the activities that she wants to do.  We talked about options I think a steroid injection is reasonable she is done well with Monovisc in the past she would like to do that which is fine told her we could do intermittent steroids we talked about platelets talked about the importance of quad and core strengthening ultimately talked about the potential need for knee replacement the future if her symptoms do not improve with these conservative measures  DX is OA  Plan: Is as above  Follow up as indicated.  Ice, elevate, and rest as needed.  Discussed conservative measures of pain control including ice, bracing.  Also talked about the importance of strengthening and maintaining ideal body weight    Rosario Silva M.D.  Large Joint Arthrocentesis: L knee  Date/Time: 8/28/2020 7:50 AM  Consent given by: patient  Site marked: site marked  Timeout: Immediately prior to procedure a time out was called to verify the correct patient, procedure, equipment, support staff and site/side marked as required   Supporting Documentation  Indications: pain   Procedure Details  Location: knee - L knee  Preparation:  Patient was prepped and draped in the usual sterile fashion  Needle size: 22 G  Approach: anteromedial  Medications administered: 88 mg Hyaluronan 88 MG/4ML; 4 mL lidocaine (cardiac)  Patient tolerance: patient tolerated the procedure well with no immediate complications

## 2020-09-15 ENCOUNTER — TELEPHONE (OUTPATIENT)
Dept: ORTHOPEDIC SURGERY | Facility: CLINIC | Age: 65
End: 2020-09-15

## 2021-02-08 ENCOUNTER — OFFICE VISIT (OUTPATIENT)
Dept: ORTHOPEDIC SURGERY | Facility: CLINIC | Age: 66
End: 2021-02-08

## 2021-02-08 VITALS — WEIGHT: 128 LBS | HEIGHT: 62 IN | BODY MASS INDEX: 23.55 KG/M2 | TEMPERATURE: 97.3 F

## 2021-02-08 DIAGNOSIS — M79.604 PAIN IN BOTH LOWER EXTREMITIES: ICD-10-CM

## 2021-02-08 DIAGNOSIS — M79.605 PAIN IN BOTH LOWER EXTREMITIES: ICD-10-CM

## 2021-02-08 DIAGNOSIS — M17.10 PRIMARY LOCALIZED OSTEOARTHROSIS OF LOWER LEG, UNSPECIFIED LATERALITY: Primary | ICD-10-CM

## 2021-02-08 PROCEDURE — 99213 OFFICE O/P EST LOW 20 MIN: CPT | Performed by: ORTHOPAEDIC SURGERY

## 2021-02-08 PROCEDURE — 20610 DRAIN/INJ JOINT/BURSA W/O US: CPT | Performed by: ORTHOPAEDIC SURGERY

## 2021-02-08 RX ORDER — ALPRAZOLAM 0.5 MG/1
TABLET ORAL
COMMUNITY
Start: 2020-12-01

## 2021-02-08 RX ORDER — ERGOCALCIFEROL 1.25 MG/1
CAPSULE ORAL
COMMUNITY
Start: 2020-11-27

## 2021-02-08 RX ADMIN — METHYLPREDNISOLONE ACETATE 80 MG: 80 INJECTION, SUSPENSION INTRA-ARTICULAR; INTRALESIONAL; INTRAMUSCULAR; SOFT TISSUE at 14:41

## 2021-02-08 NOTE — PROGRESS NOTES
Left knee  Patient: Lizeth Kennedy  YOB: 1955  Date of Service: 2/8/2021    Chief Complaints:   Chief Complaint   Patient presents with   • Left Knee - Follow-up, Pain   Left knee pain  Subjective:    History of Present Illness: Pt is seen in the office today with complaints of left knee pain  This nice lady for years for this left knee while she is had progression of her symptoms now has some complaints of hip and back all probably from an altered gait I do think it is time for her to consider arthroplasty symptoms are moderate to severe activity limiting her past medical history medications are all well listed below she is also complaining of bilateral lower extremity pain is a deep aching pain she states it felt more like this when she had a blood clot so she really feels like it is vascular in some fashion  Chief Complaint   Patient presents with   • Left Knee - Follow-up, Pain   .          Allergies:   Allergies   Allergen Reactions   • Midazolam Itching   • Diazepam Other (See Comments)     OPPOSITE EFFECT--MAKES HYPER  OPPOSITE EFFECT--MAKES HYPER  OPPOSITE EFFECT--MAKES HYPER       Medications:   Home Medications:  Current Outpatient Medications on File Prior to Visit   Medication Sig   • aspirin 81 MG tablet Take 81 mg by mouth 1 (One) Time Per Week.   • calcium carbonate (OS-ELADIO) 600 MG tablet Take 600 mg by mouth Daily.   • vitamin D (ERGOCALCIFEROL) 1.25 MG (00136 UT) capsule capsule    • ALPRAZolam (XANAX) 0.5 MG tablet    • Calcium Carb-Cholecalciferol 600-800 MG-UNIT chewable tablet Chew 1 tablet Daily.   • cholecalciferol (VITAMIN D3) 1000 units tablet Take 1,000 Units by mouth Daily.     No current facility-administered medications on file prior to visit.      Current Medications:  Scheduled Meds:  Continuous Infusions:No current facility-administered medications for this visit.     PRN Meds:.    I have reviewed the patient's medical history in detail and updated the computerized  patient record.  Review and summarization of old records include:    Past Medical History:   Diagnosis Date   • History of DVT (deep vein thrombosis)    • History of pulmonary embolism    • PONV (postoperative nausea and vomiting)         Past Surgical History:   Procedure Laterality Date   •  SECTION     • COLONOSCOPY N/A 2018    Procedure: COLONOSCOPY with polypectomy (cold bx);  Surgeon: Nnamdi Gregorio MD;  Location: [facility] ENDOSCOPY;  Service: Gastroenterology   • HERNIA REPAIR     • KNEE SURGERY Left         Social History     Occupational History   • Not on file   Tobacco Use   • Smoking status: Never Smoker   • Smokeless tobacco: Never Used   Substance and Sexual Activity   • Alcohol use: No   • Drug use: No   • Sexual activity: Defer      Social History     Social History Narrative   • Not on file        Family History   Problem Relation Age of Onset   • Malig Hyperthermia Neg Hx        ROS: 14 point review of systems was performed and was negative except for documented findings in HPI and today's encounter.     Allergies:   Allergies   Allergen Reactions   • Midazolam Itching   • Diazepam Other (See Comments)     OPPOSITE EFFECT--MAKES HYPER  OPPOSITE EFFECT--MAKES HYPER  OPPOSITE EFFECT--MAKES HYPER     Constitutional:  Denies fever, shaking or chills   Eyes:  Denies change in visual acuity   HENT:  Denies nasal congestion or sore throat   Respiratory:  Denies cough or shortness of breath   Cardiovascular:  Denies chest pain or severe LE edema   GI:  Denies abdominal pain, nausea, vomiting, bloody stools or diarrhea   Musculoskeletal:  Numbness, tingling, or loss of motor function only as noted above in history of present illness.  : Denies painful urination or hematuria  Integument:  Denies rash, lesion or ulceration   Neurologic:  Denies headache or focal weakness  Endocrine:  Denies lymphadenopathy  Psych:  Denies confusion or change in mental status   Hem:  Denies active  bleeding      Physical Exam: 65 y.o. female  Wt Readings from Last 3 Encounters:   02/08/21 58.1 kg (128 lb)   08/28/20 59.9 kg (132 lb 1.6 oz)   12/02/19 61.2 kg (135 lb)       Body mass index is 23.41 kg/m².  Facility age limit for growth percentiles is 20 years.  Vitals:    02/08/21 1439   Temp: 97.3 °F (36.3 °C)     Vital signs reviewed.   General Appearance:    Alert, cooperative, in no acute distress                    Ortho exam      Physical exam of the left knee reveals no effusion, no erythema.  It mild loss of extension and full flexion  Patient has mild varus alignment.  They have mild tenderness to palpation about the medial compartment, no tenderness laterally..  The patient has a negative bounce home, negative April and a stable ligamentous exam.  Quad tone is reasonable and symmetric.  There are no overlying skin changes no lymphedema no lymphadenopathy.  There is good hip range of motion which is full symmetric and asymptomatic and a normal ankle exam.    She does have good distal pulses she is neurologically intact distally     I did review x-rays from August she does have significant medial compartment OA on the left also moderate severe patellofemoral OA    Assessment: Left knee DJD I really think it is time for her to start thinking about arthroplasty I will have her meet with Dr. Shannon to at least discuss this she would she know she does not want to do it within the next 2 or 3 months so I think an injection today is quite reasonable.  As far as her bilateral lower extremity pain I think this may be more vascular claudication the other option would be spinal stenosis I will have her see one of the vascular guys to rule out vascular issues    Plan:   Follow up as indicated.  Ice, elevate, and rest as needed.  Discussed conservative measures of pain control including ice, bracing.  Also talked about the importance of strengthening and maintaining ideal body weight    Rosario Silva  M.D.        Large Joint Arthrocentesis: L knee  Date/Time: 2/8/2021 2:41 PM  Consent given by: patient  Site marked: site marked  Timeout: Immediately prior to procedure a time out was called to verify the correct patient, procedure, equipment, support staff and site/side marked as required   Supporting Documentation  Indications: pain   Procedure Details  Location: knee - L knee  Preparation: Patient was prepped and draped in the usual sterile fashion  Needle size: 22 G  Approach: anteromedial  Medications administered: 4 mL lidocaine (cardiac); 80 mg methylPREDNISolone acetate 80 MG/ML  Patient tolerance: patient tolerated the procedure well with no immediate complications

## 2021-02-08 NOTE — PROGRESS NOTES
New bilateral Hip      Patient: Lizeth Kennedy        YOB: 1955    Medical Record Number: [id number]        Chief Complaints: bilateral hip pain      History of Present Illness:     HPI      Allergies:   Allergies   Allergen Reactions   • Diazepam Other (See Comments)     OPPOSITE EFFECT--MAKES HYPER   • Versed [Midazolam] Itching       Medications:   Home Medications:  Current Outpatient Medications on File Prior to Visit   Medication Sig   • aspirin 81 MG tablet Take 81 mg by mouth 1 (One) Time Per Week.   • calcium carbonate (OS-ELADIO) 600 MG tablet Take 600 mg by mouth Daily.   • cholecalciferol (VITAMIN D3) 1000 units tablet Take 1,000 Units by mouth Daily.     No current facility-administered medications on file prior to visit.      Current Medications:  Scheduled Meds:  Continuous Infusions:No current facility-administered medications for this visit.     PRN Meds:.    Past Medical History:   Diagnosis Date   • History of DVT (deep vein thrombosis)    • History of pulmonary embolism    • PONV (postoperative nausea and vomiting)         Past Surgical History:   Procedure Laterality Date   •  SECTION     • COLONOSCOPY N/A 2018    Procedure: COLONOSCOPY with polypectomy (cold bx);  Surgeon: Nnamdi Gregorio MD;  Location: [facility] ENDOSCOPY;  Service: Gastroenterology   • HERNIA REPAIR     • KNEE SURGERY Left         Social History     Occupational History   • Not on file   Tobacco Use   • Smoking status: Never Smoker   • Smokeless tobacco: Never Used   Substance and Sexual Activity   • Alcohol use: No   • Drug use: No   • Sexual activity: Defer      Social History     Social History Narrative   • Not on file        Family History   Problem Relation Age of Onset   • Malig Hyperthermia Neg Hx              Review of Systems: ***  Review of Systems      Physical Exam: 65 y.o. female  General Appearance:    Alert, cooperative, in no acute distress                 There were no vitals filed  for this visit.   Patient is alert and read ×3 no acute distress appears her above-listed at height weight and age.  Affect is normal respiratory rate is normal unlabored. Heart rate regular rate rhythm, sclera, dentition and hearing are normal for the purpose of this exam.        Ortho Exam           Radiology:   AP, Lateral of the hip was ordered/reviewed to evauateknhip pain. I have compared to previous films/  I have no films for comparison.        Assessment/Plan:

## 2021-02-09 RX ORDER — METHYLPREDNISOLONE ACETATE 80 MG/ML
80 INJECTION, SUSPENSION INTRA-ARTICULAR; INTRALESIONAL; INTRAMUSCULAR; SOFT TISSUE
Status: COMPLETED | OUTPATIENT
Start: 2021-02-08 | End: 2021-02-08

## 2021-02-15 ENCOUNTER — TELEPHONE (OUTPATIENT)
Dept: ORTHOPEDICS | Facility: OTHER | Age: 66
End: 2021-02-15

## 2021-02-15 NOTE — TELEPHONE ENCOUNTER
Hub staff attempted to follow warm transfer process and was unsuccessful     Caller: AMANDA CLARK    Relationship to patient: SELF    Best call back number: [phone]    Patient is needing: PATIENT WAS RETURNING CALL FOR REJI. TRIED TO WARM TRANSFER AND WAS SENT TO HER VOICEMAIL. PATIENT IS JUST LOOKING FOR  TO  REFERRAL, AN INSURANCE REFERRAL IS NOT NEEDED. PLEASE CALL PT BACK AT NUMBER LISTED ABOVE

## 2021-02-15 NOTE — TELEPHONE ENCOUNTER
Caller: AMANDA CLARK    Relationship: SELF    Best call back number: [phone] CELL    What orders are you requesting (i.e. lab or imaging): REFERRAL FOR VASCULAR-     In what timeframe would the patient need to come in: ASAP    Where will you receive your lab/imaging services: WHERE DR ALLEN PREFERS    Additional notes: PATIENT CALLED OFFICE TO FOLLOW UP ON REFERRAL- WHEN SHE SPOKE WITH OFFICE THEY TOLD PATIENT THEY DO NOT ACCEPT FAX REFERRALS- ONLY VERBAL- AND WAS VERY SHORT AND RUDE WITH PATIENT- PATIENT CALLED US TO FOLLOW UP- PLEASE CONTACT PATIENT WHEN COMPLETED SHE WANTS TO STOP THE PAIN IN BOTH LEGS-    THANK YOU MAY CALL ANYTIME- MAY LEAVE

## 2021-03-02 ENCOUNTER — OFFICE VISIT (OUTPATIENT)
Dept: ORTHOPEDIC SURGERY | Facility: CLINIC | Age: 66
End: 2021-03-02

## 2021-03-02 VITALS — BODY MASS INDEX: 23.41 KG/M2 | WEIGHT: 128 LBS | TEMPERATURE: 97.2 F

## 2021-03-02 DIAGNOSIS — M17.12 PRIMARY OSTEOARTHRITIS OF LEFT KNEE: ICD-10-CM

## 2021-03-02 DIAGNOSIS — R52 PAIN: Primary | ICD-10-CM

## 2021-03-02 PROCEDURE — 99213 OFFICE O/P EST LOW 20 MIN: CPT | Performed by: NURSE PRACTITIONER

## 2021-03-02 PROCEDURE — 73562 X-RAY EXAM OF KNEE 3: CPT | Performed by: NURSE PRACTITIONER

## 2021-03-02 NOTE — PROGRESS NOTES
willowPatient: Lizeth Kennedy  YOB: 1955 65 y.o. female  Medical Record Number: [id number]    Chief Complaints:   Chief Complaint   Patient presents with   • Left Knee - Establish Care, Pain       History of Present Illness:Lizeth Kennedy is a 65 y.o. female who presents with complaints of having left knee pain for several years.  Patient reports her pain is mostly to the posterior aspect of her left knee but does report at times it does hurt globally.  Patient reports her pain is not daily any complications.  She reports going up and down stairs worsens the pain, sitting down eases the pain.  Patient reports whenever her pain is at its peak it is a 7 out of 10.  Patient denies any injury to her knee.  Patient reports that she has had an ongoing issue and is seeing Dr. Silva in the past.  Patient states that Dr. Silva has done several cortisone shots as well as viscosupplementation to her left knee.  She reports that viscosupplementation that usually works well and has had great luck in the past, but received a injection approximately a month ago when her pain is starting to come back.  Patient was referred to us for further evaluation.    Allergies:   Allergies   Allergen Reactions   • Midazolam Itching   • Diazepam Other (See Comments)     OPPOSITE EFFECT--MAKES HYPER  OPPOSITE EFFECT--MAKES HYPER  OPPOSITE EFFECT--MAKES HYPER       Medications:   Current Outpatient Medications   Medication Sig Dispense Refill   • ALPRAZolam (XANAX) 0.5 MG tablet      • aspirin 81 MG tablet Take 81 mg by mouth 1 (One) Time Per Week.     • Calcium Carb-Cholecalciferol 600-800 MG-UNIT chewable tablet Chew 1 tablet Daily.     • calcium carbonate (OS-ELADIO) 600 MG tablet Take 600 mg by mouth Daily.     • cholecalciferol (VITAMIN D3) 1000 units tablet Take 1,000 Units by mouth Daily.     • vitamin D (ERGOCALCIFEROL) 1.25 MG (62809 UT) capsule capsule        No current facility-administered medications for this visit.           The following portions of the patient's history were reviewed and updated as appropriate: allergies, current medications, past family history, past medical history, past social history, past surgical history and problem list.    Review of Systems:   A 14 point review of systems was performed. All systems negative except pertinent positives/negative listed in HPI above    Physical Exam:   Vitals:    03/02/21 1116   Temp: 97.2 °F (36.2 °C)   TempSrc: Temporal   Weight: 58.1 kg (128 lb)       General: A and O x 3, ASA, NAD    SCLERA:    Normal    DENTITION:   Normal    Knee:  left    ALIGNMENT: Slight Varus  ,   Patella  tracks  midline    GAIT:    Antalgic    SKIN:    No abnormality    RANGE OF MOTION:   3  -  115   DEG    STRENGTH:   4  / 5    LIGAMENTS:    No varus / valgus instability.   Negative  Lachman.    MENISCUS:     Negative   April       DISTAL PULSES:    Paplable    DISTAL SENSATION :   Intact    LYMPHATICS:     No   lymphadenopathy    OTHER:          - Positive   effusion      - Crepitance with ROM            Radiology:  Xrays 3views (ap,lateral, sunrise) were ordered and reviewed for evaluation of knee pain demonstrating advanced osteoarthritis with bone on bone articulation, subchondral cysts, and periarticular osteophytes.  There does seem to be some progression of the osteoarthritis from previous x-rays taken.    Assessment/Plan: Left knee OA  Treatment options and x-ray results were discussed with the patient.  At this point in time we will continue on with conservative measures.  Patient recently had a gel injection last month and we told her that we can probably do a steroid injection in 2 more months should she require one at that time.  We did educate the patient that we have to wait 6 months in between gel injections.  We did instruct the patient that at some point she will require a knee replacement.  At this point patient can follow-up with us on an as-needed basis.  Patient voices  understanding satisfaction with this plan today.    Layton Alves, APRN  3/2/2021     This patient was seen in conjunction today with Dr. Desean Shannon.  Dr. Shannon agrees with the above-stated assessment and plan.

## 2021-03-19 ENCOUNTER — BULK ORDERING (OUTPATIENT)
Dept: CASE MANAGEMENT | Facility: OTHER | Age: 66
End: 2021-03-19

## 2021-03-19 DIAGNOSIS — Z23 IMMUNIZATION DUE: ICD-10-CM

## 2022-04-11 ENCOUNTER — TELEPHONE (OUTPATIENT)
Dept: ORTHOPEDIC SURGERY | Facility: CLINIC | Age: 67
End: 2022-04-11

## 2022-04-11 NOTE — TELEPHONE ENCOUNTER
GOING OUT OF TOWN - WOULD LIKE TO COME IN BETWEEN NOW THROUGH 4/19/2022 - PATIENT ADVISED SOMEONE FROM CLINICAL WOULD CALL HER. PLZ ELADIO HER AT  [phone]

## 2022-04-11 NOTE — TELEPHONE ENCOUNTER
As long as she has not had one in the last 6 months than we can order her one. Can you check and see when her last gel injection was.

## 2022-04-15 ENCOUNTER — CLINICAL SUPPORT (OUTPATIENT)
Dept: ORTHOPEDIC SURGERY | Facility: CLINIC | Age: 67
End: 2022-04-15

## 2022-04-15 VITALS — BODY MASS INDEX: 23.55 KG/M2 | HEIGHT: 62 IN | WEIGHT: 128 LBS | TEMPERATURE: 96.4 F

## 2022-04-15 DIAGNOSIS — R52 PAIN: Primary | ICD-10-CM

## 2022-04-15 DIAGNOSIS — M17.12 PRIMARY OSTEOARTHRITIS OF LEFT KNEE: ICD-10-CM

## 2022-04-15 PROCEDURE — 20610 DRAIN/INJ JOINT/BURSA W/O US: CPT | Performed by: NURSE PRACTITIONER

## 2022-04-15 PROCEDURE — 73562 X-RAY EXAM OF KNEE 3: CPT | Performed by: NURSE PRACTITIONER

## 2022-04-15 NOTE — PROGRESS NOTES
4/15/2022    Lizeth Kennedy is here today for worsening knee pain. Pt has undergone injection of the knee in the past with good resolution of symptoms. Pt is requesting a repeat injection.     KNEE Injection Procedure Note:    Large Joint Arthrocentesis: L knee  Date/Time: 4/15/2022 9:22 AM  Consent given by: patient  Site marked: site marked  Timeout: Immediately prior to procedure a time out was called to verify the correct patient, procedure, equipment, support staff and site/side marked as required   Supporting Documentation  Indications: pain and joint swelling   Procedure Details  Location: knee - L knee  Preparation: Patient was prepped and draped in the usual sterile fashion  Needle size: 22 G  Approach: anterolateral  Medications administered: 2 mL lidocaine (cardiac); 88 mg Hyaluronan 88 MG/4ML  Patient tolerance: patient tolerated the procedure well with no immediate complications        X-rays 3 views of the left knee were ordered and reviewed for evaluation of left knee pain that show moderate joint space narrowing to the medial compartment with periarticular osteophytes present, subchondral sclerosis and cystic changes, as well as patellofemoral osteoarthritis present.  When in comparison to previous x-rays demonstrates mild fatty changes.    Lidocaine was used as a numbing agent for the soft tissue and was not  combined with the gel injection.    At the conclusion of the injection I discussed the importance of continued quad strengthening exercises on a daily basis. I will see the patient back if the symptoms should fail to improve or worsen.    Layton Alves, ESTRADA  4/15/2022

## 2022-05-18 ENCOUNTER — TELEPHONE (OUTPATIENT)
Dept: ORTHOPEDIC SURGERY | Facility: CLINIC | Age: 67
End: 2022-05-18

## 2022-05-18 NOTE — TELEPHONE ENCOUNTER
I would recommend scheduled Tylenol 650 mg every 6 hours.  She can also take Aleve or Advil as long as she does not have kidney issues or stomach problems with anti-inflammatories.  Additionally she should ice the knee.  If its not improving she can call back to be seen for further planning

## 2022-05-18 NOTE — TELEPHONE ENCOUNTER
Caller: AMANDA CLARK    Relationship: SELF    Best call back number: [phone]    What is the best time to reach you: ANY    What was the call regarding: PT HAD A GEL INJECTION IN LEFT KNEE ON 4.15.2022. PT STARTED HAVING PAIN IN THE SAME LEG A WEEK AND A HALF AGO. SHE IS OUT OF TOWN UNTIL Tuesday (5.24.22). HER LEG IS HURTING FROM HER HIP AREA TO HER FOOT. SHE IS LOOKING FOR DIRECTION AS TO WHAT SHE SHOULD DO.     Do you require a callback: YES

## 2022-07-13 ENCOUNTER — PROCEDURE VISIT (OUTPATIENT)
Dept: OBSTETRICS AND GYNECOLOGY | Facility: CLINIC | Age: 67
End: 2022-07-13

## 2022-07-13 ENCOUNTER — APPOINTMENT (OUTPATIENT)
Dept: WOMENS IMAGING | Facility: HOSPITAL | Age: 67
End: 2022-07-13

## 2022-07-13 DIAGNOSIS — Z12.31 VISIT FOR SCREENING MAMMOGRAM: Primary | ICD-10-CM

## 2022-07-13 PROCEDURE — 77063 BREAST TOMOSYNTHESIS BI: CPT | Performed by: OBSTETRICS & GYNECOLOGY

## 2022-07-13 PROCEDURE — 77063 BREAST TOMOSYNTHESIS BI: CPT | Performed by: RADIOLOGY

## 2022-07-13 PROCEDURE — 77067 SCR MAMMO BI INCL CAD: CPT | Performed by: RADIOLOGY

## 2022-07-13 PROCEDURE — 77067 SCR MAMMO BI INCL CAD: CPT | Performed by: OBSTETRICS & GYNECOLOGY

## 2022-10-18 ENCOUNTER — CLINICAL SUPPORT (OUTPATIENT)
Dept: ORTHOPEDIC SURGERY | Facility: CLINIC | Age: 67
End: 2022-10-18

## 2022-10-18 VITALS — WEIGHT: 128 LBS | TEMPERATURE: 96.7 F | BODY MASS INDEX: 23.55 KG/M2 | HEIGHT: 62 IN | RESPIRATION RATE: 16 BRPM

## 2022-10-18 DIAGNOSIS — M17.12 PRIMARY OSTEOARTHRITIS OF LEFT KNEE: Primary | ICD-10-CM

## 2022-10-18 PROCEDURE — 20610 DRAIN/INJ JOINT/BURSA W/O US: CPT | Performed by: NURSE PRACTITIONER

## 2022-10-18 NOTE — PROGRESS NOTES
10/18/2022    Lizeth Kennedy is here today for worsening knee pain. Pt has undergone injection of the knee in the past with good resolution of symptoms. Pt is requesting a repeat injection.     KNEE Injection Procedure Note:    Large Joint Arthrocentesis: L knee  Date/Time: 10/18/2022 1:06 PM  Consent given by: patient  Site marked: site marked  Timeout: Immediately prior to procedure a time out was called to verify the correct patient, procedure, equipment, support staff and site/side marked as required   Supporting Documentation  Indications: pain and joint swelling   Procedure Details  Location: knee - L knee  Preparation: Patient was prepped and draped in the usual sterile fashion  Needle size: 22 G  Approach: anterolateral  Medications administered: 3 mL lidocaine (cardiac); 88 mg Hyaluronan 88 MG/4ML  Patient tolerance: patient tolerated the procedure well with no immediate complications          At the conclusion of the injection I discussed the importance of continued quad strengthening exercises on a daily basis. I will see the patient back if the symptoms should fail to improve or worsen.    Layton Alves, APRN  10/18/2022

## 2023-04-27 ENCOUNTER — CLINICAL SUPPORT (OUTPATIENT)
Dept: ORTHOPEDIC SURGERY | Facility: CLINIC | Age: 68
End: 2023-04-27
Payer: MEDICARE

## 2023-04-27 VITALS — TEMPERATURE: 97.6 F | BODY MASS INDEX: 23.55 KG/M2 | HEIGHT: 62 IN | RESPIRATION RATE: 16 BRPM | WEIGHT: 128 LBS

## 2023-04-27 DIAGNOSIS — R52 PAIN: Primary | ICD-10-CM

## 2023-04-27 DIAGNOSIS — M17.12 PRIMARY OSTEOARTHRITIS OF LEFT KNEE: ICD-10-CM

## 2023-04-27 RX ORDER — LIDOCAINE HYDROCHLORIDE 10 MG/ML
3 INJECTION, SOLUTION EPIDURAL; INFILTRATION; INTRACAUDAL; PERINEURAL
Status: COMPLETED | OUTPATIENT
Start: 2023-04-27 | End: 2023-04-27

## 2023-04-27 RX ADMIN — LIDOCAINE HYDROCHLORIDE 3 ML: 10 INJECTION, SOLUTION EPIDURAL; INFILTRATION; INTRACAUDAL; PERINEURAL at 11:04

## 2023-04-27 NOTE — PROGRESS NOTES
4/27/2023    Lizeth Kennedy is here today for worsening knee pain. Pt has undergone injection of the knee in the past with good resolution of symptoms. Pt is requesting a repeat injection.     KNEE Injection Procedure Note:    Large Joint Arthrocentesis: L knee  Date/Time: 4/27/2023 11:04 AM  Consent given by: patient  Site marked: site marked  Timeout: Immediately prior to procedure a time out was called to verify the correct patient, procedure, equipment, support staff and site/side marked as required   Supporting Documentation  Indications: pain and joint swelling   Procedure Details  Location: knee - L knee  Preparation: Patient was prepped and draped in the usual sterile fashion  Needle size: 22 G  Approach: anterolateral  Medications administered: 88 mg Hyaluronan 88 MG/4ML; 3 mL lidocaine PF 1% 1 %  Patient tolerance: patient tolerated the procedure well with no immediate complications      3 mL of lidocaine was used for anesthetic purposes    Images: X-rays 3 views left knee (AP, lateral, sunrise views) ordered and reviewed for evaluation of left knee pain which demonstrate advanced varus deformity with near bone-on-bone articulation, periarticular osteophytes present as well as chondrocalcinosis.  Patient also has medial patella facet osteoarthritis that is bone-on-bone contact.  In comparison to previous films patient does not demonstrate significant arthritic changes.    At the conclusion of the injection I discussed the importance of continued quad strengthening exercises on a daily basis. I will see the patient back if the symptoms should fail to improve or worsen.    Layton Alves, APRN  4/27/2023

## 2023-10-27 ENCOUNTER — CLINICAL SUPPORT (OUTPATIENT)
Dept: ORTHOPEDIC SURGERY | Facility: CLINIC | Age: 68
End: 2023-10-27
Payer: MEDICARE

## 2023-10-27 VITALS — HEIGHT: 62 IN | WEIGHT: 128.8 LBS | BODY MASS INDEX: 23.7 KG/M2 | TEMPERATURE: 98 F

## 2023-10-27 DIAGNOSIS — M17.12 PRIMARY OSTEOARTHRITIS OF LEFT KNEE: Primary | ICD-10-CM

## 2023-10-27 RX ORDER — LIDOCAINE HYDROCHLORIDE 10 MG/ML
3 INJECTION, SOLUTION EPIDURAL; INFILTRATION; INTRACAUDAL; PERINEURAL
Status: COMPLETED | OUTPATIENT
Start: 2023-10-27 | End: 2023-10-27

## 2023-10-27 RX ADMIN — LIDOCAINE HYDROCHLORIDE 3 ML: 10 INJECTION, SOLUTION EPIDURAL; INFILTRATION; INTRACAUDAL; PERINEURAL at 10:33

## 2023-10-27 NOTE — PROGRESS NOTES
10/27/2023    Lizeth Kennedy is here today for worsening knee pain. Pt has undergone injection of the knee in the past with good resolution of symptoms. Pt is requesting a repeat injection.     KNEE Injection Procedure Note:    Large Joint Arthrocentesis: L knee  Date/Time: 10/27/2023 10:33 AM  Consent given by: patient  Site marked: site marked  Timeout: Immediately prior to procedure a time out was called to verify the correct patient, procedure, equipment, support staff and site/side marked as required   Supporting Documentation  Indications: pain and joint swelling   Procedure Details  Location: knee - L knee  Preparation: Patient was prepped and draped in the usual sterile fashion  Needle size: 22 G  Approach: anterolateral  Medications administered: 3 mL lidocaine PF 1% 1 %; 88 mg Hyaluronan 88 MG/4ML  Patient tolerance: patient tolerated the procedure well with no immediate complications      (3 mL of lidocaine was used for anesthetic purposes)    At the conclusion of the injection I discussed the importance of continued quad strengthening exercises on a daily basis. I will see the patient back if the symptoms should fail to improve or worsen.    Layton Alves, APRN  10/27/2023

## 2024-07-03 ENCOUNTER — HOSPITAL ENCOUNTER (OUTPATIENT)
Facility: HOSPITAL | Age: 69
Discharge: HOME | End: 2024-07-03
Attending: EMERGENCY MEDICINE | Admitting: EMERGENCY MEDICINE
Payer: MEDICARE

## 2024-07-03 VITALS
DIASTOLIC BLOOD PRESSURE: 84 MMHG | SYSTOLIC BLOOD PRESSURE: 131 MMHG | OXYGEN SATURATION: 99 % | WEIGHT: 128 LBS | HEIGHT: 62 IN | HEART RATE: 96 BPM | BODY MASS INDEX: 23.55 KG/M2 | TEMPERATURE: 98.7 F | RESPIRATION RATE: 18 BRPM

## 2024-07-03 DIAGNOSIS — J01.00 ACUTE NON-RECURRENT MAXILLARY SINUSITIS: Primary | ICD-10-CM

## 2024-07-03 DIAGNOSIS — J02.9 PHARYNGITIS, UNSPECIFIED ETIOLOGY: ICD-10-CM

## 2024-07-03 LAB
FLUAV SUBTYP SPEC NAA+PROBE: NOT DETECTED
FLUBV RNA ISLT QL NAA+PROBE: NOT DETECTED
SARS-COV-2 RNA RESP QL NAA+PROBE: NOT DETECTED
STREP A PCR: NOT DETECTED

## 2024-07-03 PROCEDURE — 87636 SARSCOV2 & INF A&B AMP PRB: CPT | Performed by: EMERGENCY MEDICINE

## 2024-07-03 PROCEDURE — 63710000001 PREDNISONE PER 1 MG: Performed by: EMERGENCY MEDICINE

## 2024-07-03 PROCEDURE — 87651 STREP A DNA AMP PROBE: CPT | Performed by: EMERGENCY MEDICINE

## 2024-07-03 PROCEDURE — 99213 OFFICE O/P EST LOW 20 MIN: CPT | Performed by: EMERGENCY MEDICINE

## 2024-07-03 PROCEDURE — G0463 HOSPITAL OUTPT CLINIC VISIT: HCPCS | Performed by: EMERGENCY MEDICINE

## 2024-07-03 RX ORDER — CEPHALEXIN 500 MG/1
500 CAPSULE ORAL 2 TIMES DAILY
Qty: 20 CAPSULE | Refills: 0 | Status: SHIPPED | OUTPATIENT
Start: 2024-07-03 | End: 2024-07-13

## 2024-07-03 RX ORDER — CEPHALEXIN 500 MG/1
500 CAPSULE ORAL ONCE
Status: COMPLETED | OUTPATIENT
Start: 2024-07-03 | End: 2024-07-03

## 2024-07-03 RX ORDER — HYDROXYZINE HYDROCHLORIDE 25 MG/1
TABLET, FILM COATED ORAL
COMMUNITY
Start: 2024-05-05

## 2024-07-03 RX ORDER — PREDNISONE 20 MG/1
40 TABLET ORAL ONCE
Status: COMPLETED | OUTPATIENT
Start: 2024-07-03 | End: 2024-07-03

## 2024-07-03 RX ADMIN — CEPHALEXIN 500 MG: 500 CAPSULE ORAL at 09:40

## 2024-07-03 RX ADMIN — PREDNISONE 40 MG: 20 TABLET ORAL at 09:40

## 2024-07-03 NOTE — DISCHARGE INSTRUCTIONS
Your COVID, flu, and strep test were negative.  I think a course of antibiotics is reasonable and you were started here today in the ER.  I sent a prescription to your pharmacy.  Start the prescription late this afternoon.  You are given a dose of steroid here that is good for 24 hours and it may reduce the soreness and inflammation in your sinuses and throat.  You can start ibuprofen around noon today 400 to 600 mg every 6 hours with food or fluid to reduce inflammation which is one cause of pain.  Please follow-up with your doctor within a week.  Or you can return here if you have any concerns.

## 2024-07-03 NOTE — FSED PROVIDER NOTE
Subjective   History of Present Illness  68-year-old female with 5 days of symptoms of earache and sore throat with a productive cough and nasal congestion.  The sore throat is made worse by postnasal drainage she thinks.  No known fever.  Sore throat makes it hard to sleep.  She has not had head cold or any kind illness in years.  She recently spent time with her son his wife and the grandchildren.  None of them were sick that she is aware of.  No stiff or sore neck or rash, no change in mental status or speech or ability to walk or take care of herself.  No nausea or vomiting or diarrhea, no back pain or abdominal pain.      Review of Systems    Past Medical History:   Diagnosis Date    History of DVT (deep vein thrombosis)     History of pulmonary embolism     PONV (postoperative nausea and vomiting)        Allergies   Allergen Reactions    Midazolam Itching    Diazepam Other (See Comments)     OPPOSITE EFFECT--MAKES HYPER  OPPOSITE EFFECT--MAKES HYPER  OPPOSITE EFFECT--MAKES HYPER       Past Surgical History:   Procedure Laterality Date     SECTION      COLONOSCOPY N/A 2018    Procedure: COLONOSCOPY with polypectomy (cold bx);  Surgeon: Nnamdi Gregorio MD;  Location: [facility] ENDOSCOPY;  Service: Gastroenterology    HERNIA REPAIR      KNEE SURGERY Left        Family History   Problem Relation Age of Onset    Malig Hyperthermia Neg Hx        Social History     Socioeconomic History    Marital status:    Tobacco Use    Smoking status: Never    Smokeless tobacco: Never   Vaping Use    Vaping status: Never Used   Substance and Sexual Activity    Alcohol use: No    Drug use: No    Sexual activity: Defer           Objective   Physical Exam  Vitals and nursing note reviewed.   Constitutional:       Appearance: Normal appearance. She is normal weight.   HENT:      Head: Normocephalic.      Nose: Nose normal.      Mouth/Throat:      Mouth: Mucous membranes are moist.      Pharynx: Oropharynx is  clear.   Eyes:      Extraocular Movements: Extraocular movements intact.      Conjunctiva/sclera: Conjunctivae normal.   Cardiovascular:      Rate and Rhythm: Normal rate and regular rhythm.      Pulses: Normal pulses.      Heart sounds: Normal heart sounds.   Pulmonary:      Effort: Pulmonary effort is normal. No respiratory distress.      Breath sounds: Normal breath sounds. No stridor. No wheezing, rhonchi or rales.   Musculoskeletal:         General: Normal range of motion.      Cervical back: Normal range of motion and neck supple.      Right lower leg: No edema.   Skin:     General: Skin is warm and dry.      Capillary Refill: Capillary refill takes less than 2 seconds.      Coloration: Skin is not jaundiced or pale.      Findings: No bruising, erythema or rash.   Neurological:      General: No focal deficit present.      Mental Status: She is alert and oriented to person, place, and time. Mental status is at baseline.   Psychiatric:         Mood and Affect: Mood normal.         Behavior: Behavior normal.         Thought Content: Thought content normal.         Judgment: Judgment normal.         Procedures           ED Course  ED Course as of 07/03/24 0937   Wed Jul 03, 2024   0926 Strep COVID flu negative [AR]      ED Course User Index  [AR] Jacklyn Veliz MD                                           Medical Decision Making  Differential diagnosis includes but not limited to COVID flu strep URI viral pharyngitis sinusitis bronchitis.      Your COVID, flu, and strep test were negative.  I think a course of antibiotics is reasonable and you were started here today in the ER.  I sent a prescription to your pharmacy.  Start the prescription late this afternoon.  You are given a dose of steroid here that is good for 24 hours and it may reduce the soreness and inflammation in your sinuses and throat.  You can start ibuprofen around noon today 400 to 600 mg every 6 hours with food or fluid to reduce inflammation  which is one cause of pain.  Please follow-up with your doctor within a week.  Or you can return here if you have any concerns.    She understood and agreed    Problems Addressed:  Acute non-recurrent maxillary sinusitis: complicated acute illness or injury  Pharyngitis, unspecified etiology: complicated acute illness or injury    Risk  Prescription drug management.        Final diagnoses:   Acute non-recurrent maxillary sinusitis   Pharyngitis, unspecified etiology       ED Disposition  ED Disposition       ED Disposition   Discharge    Condition   Stable    Comment   --               Luly Olsen, DO  [address] [id number] [phone]    In 1 week  As needed         Medication List        New Prescriptions      cephalexin 500 MG capsule  Commonly known as: KEFLEX  Take 1 capsule by mouth 2 (Two) Times a Day for 10 days.               Where to Get Your Medications        These medications were sent to [facility] [id number] - [place], [place] - [address] [facility] - [phone]  - [phone] fx [address], [facility] [address]      Phone: [phone]   cephalexin 500 MG capsule

## 2024-07-24 ENCOUNTER — TELEPHONE (OUTPATIENT)
Dept: ORTHOPEDIC SURGERY | Facility: CLINIC | Age: 69
End: 2024-07-24

## 2024-07-24 NOTE — TELEPHONE ENCOUNTER
Caller: Lizeth Kennedy    Relationship to patient: Self    Best call back number: [phone]    Type of visit: GEL SHOT L KNEE    Requested date: SOONEST POSSIBLE     Additional notes:PT REQUESTING ANOTHER GEL SHOT WITH DR CRABTREE FOR HER L KNEE. PLEASE CONTACT PT TO DISCUSS.

## 2024-08-13 ENCOUNTER — CLINICAL SUPPORT (OUTPATIENT)
Dept: ORTHOPEDIC SURGERY | Facility: CLINIC | Age: 69
End: 2024-08-13
Payer: MEDICARE

## 2024-08-13 VITALS — HEIGHT: 62 IN | BODY MASS INDEX: 23.74 KG/M2 | WEIGHT: 129 LBS | TEMPERATURE: 97.1 F

## 2024-08-13 DIAGNOSIS — R52 PAIN: Primary | ICD-10-CM

## 2024-08-13 DIAGNOSIS — M17.12 PRIMARY OSTEOARTHRITIS OF LEFT KNEE: ICD-10-CM

## 2024-08-13 PROCEDURE — 73562 X-RAY EXAM OF KNEE 3: CPT | Performed by: NURSE PRACTITIONER

## 2024-08-13 PROCEDURE — 20610 DRAIN/INJ JOINT/BURSA W/O US: CPT | Performed by: NURSE PRACTITIONER

## 2024-08-13 RX ORDER — LIDOCAINE HYDROCHLORIDE 10 MG/ML
3 INJECTION, SOLUTION EPIDURAL; INFILTRATION; INTRACAUDAL; PERINEURAL
Status: COMPLETED | OUTPATIENT
Start: 2024-08-13 | End: 2024-08-13

## 2024-08-13 RX ADMIN — LIDOCAINE HYDROCHLORIDE 3 ML: 10 INJECTION, SOLUTION EPIDURAL; INFILTRATION; INTRACAUDAL; PERINEURAL at 18:52

## 2024-08-13 NOTE — PROGRESS NOTES
8/13/2024    Lizeth Kennedy is here today for worsening knee pain. Pt has undergone injection of the knee in the past with good resolution of symptoms. Pt is requesting a repeat injection.     KNEE Injection Procedure Note:    Large Joint Arthrocentesis: L knee  Date/Time: 8/13/2024 6:52 PM  Consent given by: patient  Site marked: site marked  Timeout: Immediately prior to procedure a time out was called to verify the correct patient, procedure, equipment, support staff and site/side marked as required   Supporting Documentation  Indications: pain and joint swelling   Procedure Details  Location: knee - L knee  Preparation: Patient was prepped and draped in the usual sterile fashion  Needle size: 22 G  Approach: anterolateral  Medications administered: 3 mL lidocaine PF 1% 1 %; 88 mg Hyaluronan 88 MG/4ML  Patient tolerance: patient tolerated the procedure well with no immediate complications    Imaging: X-rays 3 views left knee (AP, lateral, sunrise views) were ordered and reviewed for evaluation of knee pain which demonstrate advanced tricompartmental osteoarthritis with irregular joint surface noted to the medial compartment.  In comparison to previous films patient does demonstrate further arthritic progression.      At the conclusion of the injection I discussed the importance of continued quad strengthening exercises on a daily basis. I will see the patient back if the symptoms should fail to improve or worsen.    Layton Alves, ESTRADA  8/13/2024

## 2024-10-24 NOTE — PROGRESS NOTES
New Shoulder      Patient: Lizeth Kennedy        YOB: 1955    Medical Record Number: [id number]        Chief Complaints:       History of P This is a 68-year-old female who is right-hand-dominant presents with left shoulder pain has been ongoing for 1 month no history injury change in activity no history of similar symptoms she does have night pain resent Illness:        Allergies:   Allergies   Allergen Reactions    Midazolam Itching    Diazepam Other (See Comments)     OPPOSITE EFFECT--MAKES HYPER  OPPOSITE EFFECT--MAKES HYPER  OPPOSITE EFFECT--MAKES HYPER       Medications:   Home Medications:  Current Outpatient Medications on File Prior to Visit   Medication Sig    Calcium Carb-Cholecalciferol 600-800 MG-UNIT chewable tablet Chew 1 tablet Daily.    calcium carbonate (OS-ELADIO) 600 MG tablet Take 1 tablet by mouth Daily.    hydrOXYzine (ATARAX) 25 MG tablet     vitamin D (ERGOCALCIFEROL) 1.25 MG (80563 UT) capsule capsule     ALPRAZolam (XANAX) 0.5 MG tablet     cholecalciferol (VITAMIN D3) 1000 units tablet Take 1 tablet by mouth Daily.     No current facility-administered medications on file prior to visit.     Current Medications:  Scheduled Meds:  Continuous Infusions:No current facility-administered medications for this visit.    PRN Meds:.    Past Medical History:   Diagnosis Date    History of DVT (deep vein thrombosis)     History of pulmonary embolism     Knee swelling     PONV (postoperative nausea and vomiting)         Past Surgical History:   Procedure Laterality Date     SECTION      COLONOSCOPY N/A 2018    Procedure: COLONOSCOPY with polypectomy (cold bx);  Surgeon: Nnamdi Gregorio MD;  Location: [facility] ENDOSCOPY;  Service: Gastroenterology    HAND SURGERY  2016    HERNIA REPAIR      KNEE SURGERY Left     WRIST SURGERY          Social History     Occupational History    Not on file   Tobacco Use    Smoking status: Never    Smokeless tobacco: Never   Vaping Use     "Vaping status: Never Used   Substance and Sexual Activity    Alcohol use: No    Drug use: No    Sexual activity: Not Currently     Partners: Male     Birth control/protection: None      Social History     Social History Narrative    Not on file        Family History   Problem Relation Age of Onset    Malig Hyperthermia Neg Hx              Review of Systems:     Review of Systems      Physical Exam: 69 y.o. female  General Appearance:    Alert, cooperative, in no acute distress                   Vitals:    10/31/24 0845   Temp: 98.7 °F (37.1 °C)   TempSrc: Temporal   Weight: 58.2 kg (128 lb 4.8 oz)   Height: 157.5 cm (62\")   PainSc:   5   PainLoc: Shoulder      Patient is alert and read ×3 no acute distress appears her above-listed at height weight and age.  Affect is normal respiratory rate is normal unlabored. Heart rate regular rate rhythm, sclera, dentition and hearing are normal for the purpose of this exam.    Ortho Exam  Physical exam of the left shoulder reveals no overlying skin changes no lymphedema no lymphadenopathy.  Patient has active flexion 170 with mild symptoms abduction is similar external rotation is to 50 and internal rotation to the mid lumbar spine with mild symptoms.  Patient has good rotator cuff strength 4+ over 5 with isometric strength testing with pain.  Patient has a positive impingement and a positive Mccoy sign.  Patient has good cervical range of motion which is full and asymptomatic no radicular symptoms.  Patient has a normal elbow exam.  Good distal pulses are presentPatient has pain with overhead activity and a positive Neer sign and a positive empty can sign  They have a positive drop arm any definitive painful arc   Large Joint Arthrocentesis: L glenohumeral  Date/Time: 10/31/2024 9:18 AM  Consent given by: patient  Site marked: site marked  Timeout: Immediately prior to procedure a time out was called to verify the correct patient, procedure, equipment, support staff and " site/side marked as required   Supporting Documentation  Indications: pain   Procedure Details  Location: shoulder - L glenohumeral  Preparation: Patient was prepped and draped in the usual sterile fashion  Needle gauge: 21G.  Approach: posterior  Medications administered: 1 mL methylPREDNISolone acetate 80 MG/ML; 2 mL lidocaine PF 1% 1 %  Patient tolerance: patient tolerated the procedure well with no immediate complications              Radiology:   AP, Scapular Y and Axillary Lateral of the left shoulder were ordered/reviewed to evauate shoulder pain.  I have no comparative films she has a small osteophyte seen inferiorly on the AP but good Menser joint space on x-ray lateral no acute pathology  Imaging Results (Most Recent)       Procedure Component Value Units Date/Time    XR Shoulder 2+ View Left [[id number]] Resulted: 10/31/24 0828     Updated: 10/31/24 0828    Impression:      Ordering physician's impression is located in the Encounter Note dated 10/31/24. X-ray performed in the DR room.            Assessment/Plan: Left shoulder pain I think this is early arthritis we talked about options plan is to proceed with a glenohumeral injection.  She is a diabetic she knows to watch her blood sugar she knows her her risk is a bit higher with her diabetes  Cortisone Injection. See procedure note.  Cortisone Injection for DIAGNOSTIC and THERAPUTIC purposes.  Answers submitted by the patient for this visit:  Primary Reason for Visit (Submitted on 10/28/2024)  What is the primary reason for your visit?: Extremity Pain  Lower Extremity Injury Questionnaire (Submitted on 10/28/2024)  Chief Complaint: Extremity pain  Injury: No  Pain location: left shoulder, left arm  Pain quality: aching  Pain - numeric: 6/10  Progression since onset: worse  tingling: No  Additional information: raising left arm, can't reach behind my back

## 2024-10-31 ENCOUNTER — OFFICE VISIT (OUTPATIENT)
Dept: ORTHOPEDIC SURGERY | Facility: CLINIC | Age: 69
End: 2024-10-31
Payer: MEDICARE

## 2024-10-31 VITALS — BODY MASS INDEX: 23.61 KG/M2 | WEIGHT: 128.3 LBS | TEMPERATURE: 98.7 F | HEIGHT: 62 IN

## 2024-10-31 DIAGNOSIS — M25.512 LEFT SHOULDER PAIN, UNSPECIFIED CHRONICITY: Primary | ICD-10-CM

## 2024-10-31 DIAGNOSIS — M19.012 PRIMARY LOCALIZED OSTEOARTHROSIS OF LEFT SHOULDER REGION: ICD-10-CM

## 2024-10-31 RX ADMIN — LIDOCAINE HYDROCHLORIDE 2 ML: 10 INJECTION, SOLUTION EPIDURAL; INFILTRATION; INTRACAUDAL; PERINEURAL at 09:18

## 2024-10-31 RX ADMIN — METHYLPREDNISOLONE ACETATE 1 ML: 80 INJECTION, SUSPENSION INTRA-ARTICULAR; INTRALESIONAL; INTRAMUSCULAR; SOFT TISSUE at 09:18

## 2024-11-06 RX ORDER — METHYLPREDNISOLONE ACETATE 80 MG/ML
1 INJECTION, SUSPENSION INTRA-ARTICULAR; INTRALESIONAL; INTRAMUSCULAR; SOFT TISSUE
Status: COMPLETED | OUTPATIENT
Start: 2024-10-31 | End: 2024-10-31

## 2024-11-06 RX ORDER — LIDOCAINE HYDROCHLORIDE 10 MG/ML
2 INJECTION, SOLUTION EPIDURAL; INFILTRATION; INTRACAUDAL; PERINEURAL
Status: COMPLETED | OUTPATIENT
Start: 2024-10-31 | End: 2024-10-31

## 2024-12-17 ENCOUNTER — TELEPHONE (OUTPATIENT)
Dept: ORTHOPEDIC SURGERY | Facility: CLINIC | Age: 69
End: 2024-12-17

## 2024-12-17 NOTE — TELEPHONE ENCOUNTER
Caller: Lizeth Kennedy    Relationship: Self    Best call back number: [phone]*    What form or medical record are you requesting: XRAY FROM 10/31/24    Who is requesting this form or medical record from you: DO KAILA [facility]    Timeframe paperwork needed: ASAP    Additional notes: PT DID NOT HAVE FAX NUMBER FOR THE OFFICE , THE PHONE NUMBER -[phone]

## 2025-05-14 NOTE — PROGRESS NOTES
Shoulder MRI Follow Up      Patient: Lizeth Kennedy        YOB: 1955            Chief Complaints: Shoulder pain left      History of Present Illness: The patient is here follow-up of an MRI of the shoulder actually did not order her MRI of seen her for glenohumeral arthritis she noticed to bump on the top of her shoulder at her acromioclavicular joint her primary did order an MRI the MRI demonstrates severe glenohumeral arthritis she has a lot of edema involving the entirety of the humeral head they did a bone scan as well which showed delayed uptake in the shoulders left worse than his right she really has no pain today does not want another injection      Physical Exam: 69 y.o. female  General Appearance:    Alert, cooperative, in no acute distress                 There were no vitals filed for this visit.     Patient is alert and read ×3 no acute distress appears her above-listed at height weight and age.  Affect is normal respiratory rate is normal unlabored. Heart rate regular rate rhythm, sclera, dentition and hearing are normal for the purpose of this exam.      Ortho Exam  Physical exam the left shoulder reveals no overlying skin changes no lymphedema lymphadenopathy the patient can actively flex to about 150 passively I get them to 160 abduction is similar external rotation is 40 internal rotation to there buttock.  Rotator cuff strength is 4+ over 5 with isometric strength testing no overlying skin changes.  Patient has reasonable cervical range of motion for their age no radicular symptoms and a normal elbow exam.  There are good distal pulses.     MRI Results: MRIs as above I have reviewed and agree  Procedures      Assessment/Plan: Left shoulder glenohumeral arthritis she does have a lot of edema in that humeral head the entire humeral head lights up.  I expect to see some with the arthritis but this is a lot more than I would expect.  I am going to reach out to Dr. Romo and perhaps one  of our radiologist to put this together and make sure we do not need to look into this further

## 2025-05-22 ENCOUNTER — OFFICE VISIT (OUTPATIENT)
Dept: ORTHOPEDIC SURGERY | Facility: CLINIC | Age: 70
End: 2025-05-22
Payer: MEDICARE

## 2025-05-22 VITALS — WEIGHT: 128.2 LBS | BODY MASS INDEX: 23.59 KG/M2 | HEIGHT: 62 IN | TEMPERATURE: 97 F

## 2025-05-22 DIAGNOSIS — M25.512 LEFT SHOULDER PAIN, UNSPECIFIED CHRONICITY: Primary | ICD-10-CM

## 2025-05-22 PROCEDURE — 99213 OFFICE O/P EST LOW 20 MIN: CPT | Performed by: ORTHOPAEDIC SURGERY

## (undated) DEVICE — SINGLE-USE BIOPSY FORCEPS: Brand: RADIAL JAW 4

## (undated) DEVICE — THE TORRENT IRRIGATION SCOPE CONNECTOR IS USED WITH THE TORRENT IRRIGATION TUBING TO PROVIDE IRRIGATION FLUIDS SUCH AS STERILE WATER DURING GASTROINTESTINAL ENDOSCOPIC PROCEDURES WHEN USED IN CONJUNCTION WITH AN IRRIGATION PUMP (OR ELECTROSURGICAL UNIT).: Brand: TORRENT

## (undated) DEVICE — CANN NASL CO2 TRULINK W/O2 A/

## (undated) DEVICE — Device: Brand: DEFENDO AIR/WATER/SUCTION AND BIOPSY VALVE

## (undated) DEVICE — TUBING, SUCTION, 1/4" X 10', STRAIGHT: Brand: MEDLINE